# Patient Record
Sex: MALE | Race: WHITE | Employment: FULL TIME | URBAN - METROPOLITAN AREA
[De-identification: names, ages, dates, MRNs, and addresses within clinical notes are randomized per-mention and may not be internally consistent; named-entity substitution may affect disease eponyms.]

---

## 2019-02-06 ENCOUNTER — OFFICE VISIT (OUTPATIENT)
Dept: SURGERY | Age: 44
End: 2019-02-06
Payer: COMMERCIAL

## 2019-02-06 VITALS
WEIGHT: 264 LBS | SYSTOLIC BLOOD PRESSURE: 104 MMHG | HEIGHT: 67 IN | DIASTOLIC BLOOD PRESSURE: 80 MMHG | BODY MASS INDEX: 41.44 KG/M2

## 2019-02-06 DIAGNOSIS — K42.9 UMBILICAL HERNIA WITHOUT OBSTRUCTION AND WITHOUT GANGRENE: Primary | ICD-10-CM

## 2019-02-06 PROCEDURE — 99242 OFF/OP CONSLTJ NEW/EST SF 20: CPT | Performed by: SURGERY

## 2019-02-06 RX ORDER — ATORVASTATIN CALCIUM 40 MG/1
40 TABLET, FILM COATED ORAL
COMMUNITY
Start: 2018-10-12

## 2019-02-06 ASSESSMENT — ENCOUNTER SYMPTOMS
COUGH: 1
SINUS PRESSURE: 1
GASTROINTESTINAL NEGATIVE: 1
EYES NEGATIVE: 1
ALLERGIC/IMMUNOLOGIC NEGATIVE: 1

## 2019-03-26 ENCOUNTER — ANESTHESIA (OUTPATIENT)
Dept: OPERATING ROOM | Age: 44
End: 2019-03-26
Payer: COMMERCIAL

## 2019-03-26 ENCOUNTER — HOSPITAL ENCOUNTER (OUTPATIENT)
Age: 44
Setting detail: OUTPATIENT SURGERY
Discharge: HOME | End: 2019-03-26
Attending: SURGERY | Admitting: SURGERY
Payer: COMMERCIAL

## 2019-03-26 ENCOUNTER — ANESTHESIA EVENT (OUTPATIENT)
Dept: OPERATING ROOM | Age: 44
End: 2019-03-26
Payer: COMMERCIAL

## 2019-03-26 VITALS
DIASTOLIC BLOOD PRESSURE: 57 MMHG | WEIGHT: 268.9 LBS | TEMPERATURE: 97.6 F | HEART RATE: 60 BPM | HEIGHT: 67 IN | RESPIRATION RATE: 14 BRPM | OXYGEN SATURATION: 97 % | BODY MASS INDEX: 42.2 KG/M2 | SYSTOLIC BLOOD PRESSURE: 115 MMHG

## 2019-03-26 VITALS
SYSTOLIC BLOOD PRESSURE: 139 MMHG | RESPIRATION RATE: 23 BRPM | DIASTOLIC BLOOD PRESSURE: 87 MMHG | TEMPERATURE: 95.7 F | OXYGEN SATURATION: 95 %

## 2019-03-26 DIAGNOSIS — K42.9 UMBILICAL HERNIA WITHOUT OBSTRUCTION AND WITHOUT GANGRENE: Primary | ICD-10-CM

## 2019-03-26 PROCEDURE — 7100000001 HC PACU RECOVERY - ADDTL 15 MIN: Performed by: SURGERY

## 2019-03-26 PROCEDURE — 7100000000 HC PACU RECOVERY - FIRST 15 MIN: Performed by: SURGERY

## 2019-03-26 PROCEDURE — 3600000002 HC SURGERY LEVEL 2 BASE: Performed by: SURGERY

## 2019-03-26 PROCEDURE — 3700000001 HC ADD 15 MINUTES (ANESTHESIA): Performed by: SURGERY

## 2019-03-26 PROCEDURE — 3700000000 HC ANESTHESIA ATTENDED CARE: Performed by: SURGERY

## 2019-03-26 PROCEDURE — 6360000002 HC RX W HCPCS: Performed by: SURGERY

## 2019-03-26 PROCEDURE — 2580000003 HC RX 258: Performed by: SURGERY

## 2019-03-26 PROCEDURE — 2580000003 HC RX 258: Performed by: NURSE ANESTHETIST, CERTIFIED REGISTERED

## 2019-03-26 PROCEDURE — 49585 REPAIR UMBILICAL HERN,5+Y/O,REDUC: CPT | Performed by: SURGERY

## 2019-03-26 PROCEDURE — 2709999900 HC NON-CHARGEABLE SUPPLY: Performed by: SURGERY

## 2019-03-26 PROCEDURE — 2580000003 HC RX 258: Performed by: FAMILY MEDICINE

## 2019-03-26 PROCEDURE — 2500000003 HC RX 250 WO HCPCS: Performed by: SURGERY

## 2019-03-26 PROCEDURE — 7100000010 HC PHASE II RECOVERY - FIRST 15 MIN: Performed by: SURGERY

## 2019-03-26 PROCEDURE — 6360000002 HC RX W HCPCS: Performed by: NURSE ANESTHETIST, CERTIFIED REGISTERED

## 2019-03-26 PROCEDURE — 3600000012 HC SURGERY LEVEL 2 ADDTL 15MIN: Performed by: SURGERY

## 2019-03-26 PROCEDURE — 7100000011 HC PHASE II RECOVERY - ADDTL 15 MIN: Performed by: SURGERY

## 2019-03-26 PROCEDURE — 2500000003 HC RX 250 WO HCPCS: Performed by: NURSE ANESTHETIST, CERTIFIED REGISTERED

## 2019-03-26 RX ORDER — LABETALOL HYDROCHLORIDE 5 MG/ML
5 INJECTION, SOLUTION INTRAVENOUS EVERY 10 MIN PRN
Status: DISCONTINUED | OUTPATIENT
Start: 2019-03-26 | End: 2019-03-26 | Stop reason: HOSPADM

## 2019-03-26 RX ORDER — LIDOCAINE HYDROCHLORIDE 20 MG/ML
INJECTION, SOLUTION EPIDURAL; INFILTRATION; INTRACAUDAL; PERINEURAL PRN
Status: DISCONTINUED | OUTPATIENT
Start: 2019-03-26 | End: 2019-03-26 | Stop reason: SDUPTHER

## 2019-03-26 RX ORDER — ROCURONIUM BROMIDE 10 MG/ML
INJECTION, SOLUTION INTRAVENOUS PRN
Status: DISCONTINUED | OUTPATIENT
Start: 2019-03-26 | End: 2019-03-26 | Stop reason: SDUPTHER

## 2019-03-26 RX ORDER — SODIUM CHLORIDE 9 MG/ML
INJECTION, SOLUTION INTRAVENOUS CONTINUOUS
Status: DISCONTINUED | OUTPATIENT
Start: 2019-03-26 | End: 2019-03-26 | Stop reason: HOSPADM

## 2019-03-26 RX ORDER — OXYCODONE HYDROCHLORIDE 5 MG/1
5 TABLET ORAL PRN
Status: DISCONTINUED | OUTPATIENT
Start: 2019-03-26 | End: 2019-03-26 | Stop reason: HOSPADM

## 2019-03-26 RX ORDER — ONDANSETRON 2 MG/ML
INJECTION INTRAMUSCULAR; INTRAVENOUS PRN
Status: DISCONTINUED | OUTPATIENT
Start: 2019-03-26 | End: 2019-03-26 | Stop reason: SDUPTHER

## 2019-03-26 RX ORDER — SUCCINYLCHOLINE CHLORIDE 20 MG/ML
INJECTION INTRAMUSCULAR; INTRAVENOUS PRN
Status: DISCONTINUED | OUTPATIENT
Start: 2019-03-26 | End: 2019-03-26 | Stop reason: SDUPTHER

## 2019-03-26 RX ORDER — HYDROMORPHONE HCL 110MG/55ML
0.25 PATIENT CONTROLLED ANALGESIA SYRINGE INTRAVENOUS EVERY 5 MIN PRN
Status: DISCONTINUED | OUTPATIENT
Start: 2019-03-26 | End: 2019-03-26 | Stop reason: HOSPADM

## 2019-03-26 RX ORDER — KETOROLAC TROMETHAMINE 30 MG/ML
INJECTION, SOLUTION INTRAMUSCULAR; INTRAVENOUS PRN
Status: DISCONTINUED | OUTPATIENT
Start: 2019-03-26 | End: 2019-03-26 | Stop reason: SDUPTHER

## 2019-03-26 RX ORDER — FENTANYL CITRATE 50 UG/ML
50 INJECTION, SOLUTION INTRAMUSCULAR; INTRAVENOUS EVERY 5 MIN PRN
Status: DISCONTINUED | OUTPATIENT
Start: 2019-03-26 | End: 2019-03-26 | Stop reason: HOSPADM

## 2019-03-26 RX ORDER — HYDROMORPHONE HCL 110MG/55ML
0.5 PATIENT CONTROLLED ANALGESIA SYRINGE INTRAVENOUS EVERY 5 MIN PRN
Status: DISCONTINUED | OUTPATIENT
Start: 2019-03-26 | End: 2019-03-26 | Stop reason: HOSPADM

## 2019-03-26 RX ORDER — PROMETHAZINE HYDROCHLORIDE 25 MG/ML
6.25 INJECTION, SOLUTION INTRAMUSCULAR; INTRAVENOUS PRN
Status: DISCONTINUED | OUTPATIENT
Start: 2019-03-26 | End: 2019-03-26 | Stop reason: HOSPADM

## 2019-03-26 RX ORDER — GLYCOPYRROLATE 0.2 MG/ML
INJECTION INTRAMUSCULAR; INTRAVENOUS PRN
Status: DISCONTINUED | OUTPATIENT
Start: 2019-03-26 | End: 2019-03-26 | Stop reason: SDUPTHER

## 2019-03-26 RX ORDER — MEPERIDINE HYDROCHLORIDE 25 MG/ML
12.5 INJECTION INTRAMUSCULAR; INTRAVENOUS; SUBCUTANEOUS EVERY 5 MIN PRN
Status: DISCONTINUED | OUTPATIENT
Start: 2019-03-26 | End: 2019-03-26 | Stop reason: HOSPADM

## 2019-03-26 RX ORDER — HYDROCODONE BITARTRATE AND ACETAMINOPHEN 5; 325 MG/1; MG/1
1-2 TABLET ORAL EVERY 4 HOURS PRN
Qty: 15 TABLET | Refills: 0 | Status: SHIPPED | OUTPATIENT
Start: 2019-03-26 | End: 2019-03-29

## 2019-03-26 RX ORDER — DIPHENHYDRAMINE HYDROCHLORIDE 50 MG/ML
12.5 INJECTION INTRAMUSCULAR; INTRAVENOUS
Status: DISCONTINUED | OUTPATIENT
Start: 2019-03-26 | End: 2019-03-26 | Stop reason: HOSPADM

## 2019-03-26 RX ORDER — FENTANYL CITRATE 50 UG/ML
INJECTION, SOLUTION INTRAMUSCULAR; INTRAVENOUS PRN
Status: DISCONTINUED | OUTPATIENT
Start: 2019-03-26 | End: 2019-03-26 | Stop reason: SDUPTHER

## 2019-03-26 RX ORDER — NEOSTIGMINE METHYLSULFATE 5 MG/5 ML
SYRINGE (ML) INTRAVENOUS PRN
Status: DISCONTINUED | OUTPATIENT
Start: 2019-03-26 | End: 2019-03-26 | Stop reason: SDUPTHER

## 2019-03-26 RX ORDER — DEXAMETHASONE SODIUM PHOSPHATE 4 MG/ML
INJECTION, SOLUTION INTRA-ARTICULAR; INTRALESIONAL; INTRAMUSCULAR; INTRAVENOUS; SOFT TISSUE PRN
Status: DISCONTINUED | OUTPATIENT
Start: 2019-03-26 | End: 2019-03-26 | Stop reason: SDUPTHER

## 2019-03-26 RX ORDER — MAGNESIUM HYDROXIDE 1200 MG/15ML
LIQUID ORAL CONTINUOUS PRN
Status: COMPLETED | OUTPATIENT
Start: 2019-03-26 | End: 2019-03-26

## 2019-03-26 RX ORDER — OXYCODONE HYDROCHLORIDE 5 MG/1
10 TABLET ORAL PRN
Status: DISCONTINUED | OUTPATIENT
Start: 2019-03-26 | End: 2019-03-26 | Stop reason: HOSPADM

## 2019-03-26 RX ORDER — SODIUM CHLORIDE 0.9 % (FLUSH) 0.9 %
10 SYRINGE (ML) INJECTION EVERY 12 HOURS SCHEDULED
Status: DISCONTINUED | OUTPATIENT
Start: 2019-03-26 | End: 2019-03-26 | Stop reason: HOSPADM

## 2019-03-26 RX ORDER — MIDAZOLAM HYDROCHLORIDE 1 MG/ML
INJECTION INTRAMUSCULAR; INTRAVENOUS PRN
Status: DISCONTINUED | OUTPATIENT
Start: 2019-03-26 | End: 2019-03-26 | Stop reason: SDUPTHER

## 2019-03-26 RX ORDER — SODIUM CHLORIDE 9 MG/ML
INJECTION, SOLUTION INTRAVENOUS CONTINUOUS PRN
Status: DISCONTINUED | OUTPATIENT
Start: 2019-03-26 | End: 2019-03-26 | Stop reason: SDUPTHER

## 2019-03-26 RX ORDER — SODIUM CHLORIDE 0.9 % (FLUSH) 0.9 %
10 SYRINGE (ML) INJECTION PRN
Status: DISCONTINUED | OUTPATIENT
Start: 2019-03-26 | End: 2019-03-26 | Stop reason: HOSPADM

## 2019-03-26 RX ORDER — BUPIVACAINE HYDROCHLORIDE AND EPINEPHRINE 5; 5 MG/ML; UG/ML
INJECTION, SOLUTION EPIDURAL; INTRACAUDAL; PERINEURAL
Status: COMPLETED | OUTPATIENT
Start: 2019-03-26 | End: 2019-03-26

## 2019-03-26 RX ORDER — PROPOFOL 10 MG/ML
INJECTION, EMULSION INTRAVENOUS PRN
Status: DISCONTINUED | OUTPATIENT
Start: 2019-03-26 | End: 2019-03-26 | Stop reason: SDUPTHER

## 2019-03-26 RX ADMIN — CEFAZOLIN SODIUM 3 G: 10 INJECTION, POWDER, FOR SOLUTION INTRAVENOUS at 13:42

## 2019-03-26 RX ADMIN — SUCCINYLCHOLINE CHLORIDE 140 MG: 20 INJECTION, SOLUTION INTRAMUSCULAR; INTRAVENOUS at 13:51

## 2019-03-26 RX ADMIN — Medication 2 MG: at 14:22

## 2019-03-26 RX ADMIN — SODIUM CHLORIDE: 9 INJECTION, SOLUTION INTRAVENOUS at 11:28

## 2019-03-26 RX ADMIN — FENTANYL CITRATE 100 MCG: 50 INJECTION, SOLUTION INTRAMUSCULAR; INTRAVENOUS at 13:49

## 2019-03-26 RX ADMIN — PROPOFOL 200 MG: 10 INJECTION, EMULSION INTRAVENOUS at 13:50

## 2019-03-26 RX ADMIN — KETOROLAC TROMETHAMINE 30 MG: 30 INJECTION, SOLUTION INTRAMUSCULAR; INTRAVENOUS at 14:22

## 2019-03-26 RX ADMIN — MIDAZOLAM HYDROCHLORIDE 2 MG: 1 INJECTION, SOLUTION INTRAMUSCULAR; INTRAVENOUS at 13:45

## 2019-03-26 RX ADMIN — DEXAMETHASONE SODIUM PHOSPHATE 8 MG: 4 INJECTION, SOLUTION INTRAMUSCULAR; INTRAVENOUS at 13:55

## 2019-03-26 RX ADMIN — ROCURONIUM BROMIDE 20 MG: 10 INJECTION, SOLUTION INTRAVENOUS at 14:00

## 2019-03-26 RX ADMIN — ONDANSETRON 4 MG: 2 INJECTION INTRAMUSCULAR; INTRAVENOUS at 13:55

## 2019-03-26 RX ADMIN — GLYCOPYRROLATE 0.4 MG: 0.2 INJECTION, SOLUTION INTRAMUSCULAR; INTRAVENOUS at 14:22

## 2019-03-26 RX ADMIN — SODIUM CHLORIDE: 9 INJECTION, SOLUTION INTRAVENOUS at 13:20

## 2019-03-26 RX ADMIN — LIDOCAINE HYDROCHLORIDE 80 MG: 20 INJECTION, SOLUTION EPIDURAL; INFILTRATION; INTRACAUDAL; PERINEURAL at 13:50

## 2019-03-26 ASSESSMENT — PULMONARY FUNCTION TESTS
PIF_VALUE: 27
PIF_VALUE: 22
PIF_VALUE: 22
PIF_VALUE: 2
PIF_VALUE: 22
PIF_VALUE: 27
PIF_VALUE: 15
PIF_VALUE: 4
PIF_VALUE: 22
PIF_VALUE: 26
PIF_VALUE: 24
PIF_VALUE: 22
PIF_VALUE: 22
PIF_VALUE: 26
PIF_VALUE: 27
PIF_VALUE: 22
PIF_VALUE: 24
PIF_VALUE: 22
PIF_VALUE: 1
PIF_VALUE: 22
PIF_VALUE: 9
PIF_VALUE: 22
PIF_VALUE: 24
PIF_VALUE: 25
PIF_VALUE: 27
PIF_VALUE: 22
PIF_VALUE: 23
PIF_VALUE: 17
PIF_VALUE: 30
PIF_VALUE: 27
PIF_VALUE: 10
PIF_VALUE: 1
PIF_VALUE: 25
PIF_VALUE: 24
PIF_VALUE: 22
PIF_VALUE: 25
PIF_VALUE: 1
PIF_VALUE: 25

## 2019-03-26 ASSESSMENT — PAIN - FUNCTIONAL ASSESSMENT: PAIN_FUNCTIONAL_ASSESSMENT: 0-10

## 2019-04-12 ENCOUNTER — OFFICE VISIT (OUTPATIENT)
Dept: SURGERY | Age: 44
End: 2019-04-12

## 2019-04-12 VITALS — DIASTOLIC BLOOD PRESSURE: 80 MMHG | SYSTOLIC BLOOD PRESSURE: 110 MMHG

## 2019-04-12 DIAGNOSIS — K42.9 UMBILICAL HERNIA WITHOUT OBSTRUCTION AND WITHOUT GANGRENE: Primary | ICD-10-CM

## 2019-04-12 PROCEDURE — 99024 POSTOP FOLLOW-UP VISIT: CPT | Performed by: SURGERY

## 2020-01-27 ENCOUNTER — OFFICE VISIT (OUTPATIENT)
Dept: ORTHOPEDIC SURGERY | Age: 45
End: 2020-01-27
Payer: COMMERCIAL

## 2020-01-27 VITALS
DIASTOLIC BLOOD PRESSURE: 85 MMHG | WEIGHT: 265 LBS | HEIGHT: 67 IN | SYSTOLIC BLOOD PRESSURE: 129 MMHG | HEART RATE: 74 BPM | BODY MASS INDEX: 41.59 KG/M2

## 2020-01-27 PROCEDURE — 99203 OFFICE O/P NEW LOW 30 MIN: CPT | Performed by: ORTHOPAEDIC SURGERY

## 2020-01-27 PROCEDURE — 20610 DRAIN/INJ JOINT/BURSA W/O US: CPT | Performed by: ORTHOPAEDIC SURGERY

## 2020-01-27 RX ORDER — DICLOFENAC SODIUM 75 MG/1
75 TABLET, DELAYED RELEASE ORAL 2 TIMES DAILY
Qty: 60 TABLET | Refills: 2 | Status: SHIPPED | OUTPATIENT
Start: 2020-01-27 | End: 2020-03-02

## 2020-01-27 NOTE — PROGRESS NOTES
Is a new patient complaining of left shoulder pain. She has 2 never 3 weeks has had shoulder pain. Is can wake him up at night if he lays on his left side. He played sports growing up in is a  in [place] but he is not currently lifting. ROS: Pertinent items are noted in HPI.     No notes on file    Past Medical History:  No date: Bradycardia  No date: Colon polyp  No date: Diverticulitis  No date: Diverticulosis  No date: Hypercholesteremia  No date: IFG (impaired fasting glucose)     Past Surgical History:  No date: ADENOIDECTOMY      Comment:  UVULECTOMY  No date: COLONOSCOPY  No date: TONSILLECTOMY  0/59/9566: UMBILICAL HERNIA REPAIR; N/A      Comment:  OPEN [facility]'s family history indicates:  Problem: Heart Disease      Relation: Mother          Age of Onset: (Not Specified)  Problem: High Cholesterol      Relation: Mother          Age of Onset: (Not Specified)  Problem: Diabetes      Relation: Mother          Age of Onset: (Not Specified)  Problem: Heart Disease      Relation: Father          Age of Onset: (Not Specified)  Problem: High Blood Pressure      Relation: Father          Age of Onset: (Not Specified)  Problem: High Blood Pressure      Relation: Brother          Age of Onset: (Not Specified)      Social History    Socioeconomic History      Marital status:       Spouse name: None      Number of children: None      Years of education: None      Highest education level: None    Occupational History      None    Social Needs      Financial resource strain: None      Food insecurity:        Worry: None        Inability: None      Transportation needs:        Medical: None        Non-medical: None    Tobacco Use      Smoking status: Never Smoker      Smokeless tobacco: Current User        Types: Chew    Substance and Sexual Activity      Alcohol use: Yes        Comment: ONCE A YEAR      Drug use: None      Sexual activity: None    Lifestyle      Physical activity:        Days per week: None        Minutes per session: None      Stress: None    Relationships      Social connections:        Talks on phone: None        Gets together: None        Attends [employer] service: None        Active member of club or organization: None        Attends meetings of clubs or organizations: None        Relationship status: None      Intimate partner violence:        Fear of current or ex partner: None        Emotionally abused: None        Physically abused: None        Forced sexual activity: None    Other Topics      Concerns:        None    Social History Narrative      None      Current Outpatient Medications:  atorvastatin (LIPITOR) 40 MG tablet, Take 40 mg by mouth, Disp: , Rfl:     No current facility-administered medications for this visit. No Known Allergies    VITAL SIGNS:  /85   Pulse 74   Ht 5' 7\" (1.702 m)   Wt 265 lb (120.2 kg)   BMI 41.50 kg/m²   On examination he he can get 180 degrees of flexion but this hurts above 90 degrees. He can get about 170 degrees of abduction but this hurts above about 100 degrees. He is quite tender in [facility] joint is very positive crossarm test.  He has some anterior cuff tenderness and mild to moderately positive impingement signs. There is no supraspinatus or infraspinatus atrophy. At 90 degrees abduction he has 50 degrees internal rotation on the left versus 35 on the right he has about 90 degrees external rotation in both shoulders. He has negative belly press test bilaterally. He has obvious weakness of external rotation on the left. 3 views of the left shoulder obtained today. Shows well centered humeral head with no marginal osteophytes and no interosseous lesions. Exposure on the distal clavicle is difficult but it appears that there are some squaring of the end of the distal clavicle.     Impression left shoulder AC arthrosis and

## 2020-01-27 NOTE — PROGRESS NOTES
Depo medrol NDC [id number]  Lot# [id number]  Exp.03/2021  Shoulder,left    Ropivacaine    NCD#:  [id number]  LOT#:  [id number]  Exp Date:  05/2023  Shoulder, left

## 2020-02-04 ENCOUNTER — HOSPITAL ENCOUNTER (OUTPATIENT)
Dept: PHYSICAL THERAPY | Age: 45
Setting detail: THERAPIES SERIES
Discharge: HOME | End: 2020-02-04
Payer: COMMERCIAL

## 2020-02-04 PROCEDURE — 97161 PT EVAL LOW COMPLEX 20 MIN: CPT

## 2020-02-04 PROCEDURE — 97110 THERAPEUTIC EXERCISES: CPT

## 2020-02-04 NOTE — PLAN OF CARE
for the [employer], and notices that donning work belts, donning regular belts - are both painful activities. Additionally, sleeping on L arm, adjusting in bed, lifting and overhead movements induce pain. His goal for therapy is to restore full movement and strength in left shoulder. The patient reports that since injection, his pain has decreased considerably. He can still feel slight discomfort at end ranges, but this is improving/reducing. Relevant Medical History: NA  Functional Outcome: qDASH = 11% disability    Pain Scale: 1/10  Easing factors: rest, injection  Provocative factors: sleeping on L arm, reaching OH, lifting, reaching behind back    Type: []Constant   [x]Intermittent  []Radiating []Localized []other:     Numbness/Tingling: denies    Occupation/School:  for [employer] department    Living Status/Prior Level of Function: Prior to this injury / incident, pt was independent with ADLs and IADLs, and remains independent.     OBJECTIVE:   Hand dominance: right    Palpation: +1 TTP L greater tuberosity, LH biceps L, L coracoid process, L AC joint    Functional Mobility/Transfers: WFL    Posture: rounded shoulders, forward head    Bandages/Dressings/Incisions: NA    Dermatomes Normal Abnormal Comments   Top of head (C1) X     Posterior occipital region (C2) X     Side of neck (C3) X     Top of shoulder (C4) X     Lateral deltoid (C5) X     Tip of thumb (C6) X     Distal middle finger (C7) X     Distal fifth finger (C8) X     Medial forearm (T1) X        PROM AROM    L R L R   Shoulder Flexion  165  163 180   Shoulder Abduction  180  180 180   Shoulder External Rotation  90  85 90   Shoulder Internal Rotation  90  90 90   Elbow Flexion  WNL  WNL WNL   Elbow Extension  WNL  WNL WNL     Strength (0-5) Left Right    Shoulder Shrug (C4) 5 5   Shoulder Flex 4 5   Shoulder Abd (C5) 4 5   Shoulder ER 4 5   Shoulder IR 5 5   Biceps (C6) 5 5   Triceps (C7) 5 5   Lats []Environmental, home barriers              []profession/work barriers  []past PT/medical experience  [x]other: favorable response to previous injection  Justification: positive influence on potential     Falls Risk Assessment (30 days):   [x] Falls Risk assessed and no intervention required. [] Falls Risk assessed and Patient requires intervention due to being higher risk   TUG score (>12s at risk):     [] Falls education provided, including       ASSESSMENT: The patient is a 39-year old male who presents with signs and symptoms consistent with L shoulder tendonitis secondary to impingement. The patient will benefit from skilled PT services to address limited shoulder ROM, shoulder and scapular strength, biomechanics, posture, as well as functional mobility during ADLs/iADLs. The patient's favorable response to cortisone injection should facilitate results at this time.     Functional Impairments   []Noted spinal or UE joint hypomobility   []Noted spinal or UE joint hypermobility   []Decreased UE functional ROM   [x]Decreased UE functional strength   []Abnormal reflexes/sensation/myotomal/dermatomal deficits   [x]Decreased RC/scapular/core strength and neuromuscular control   []other:      Functional Activity Limitations (from functional questionnaire and intake)   []Reduced ability to tolerate prolonged functional positions   [x]Reduced ability or difficulty with changes of positions or transfers between positions   []Reduced ability to maintain good posture and demonstrate good body mechanics with sitting, bending, and lifting   [] Reduced ability or tolerance with driving and/or computer work   [x]Reduced ability to sleep   [x]Reduced ability to perform lifting, reaching, carrying tasks   [x]Reduced ability to tolerate impact through UE   [x]Reduced ability to reach behind back   []Reduced ability to  or hold objects   []Reduced ability to throw or toss an object   []other:    Participation Restrictions   [x]Reduced participation in self care activities   []Reduced participation in home management activities   [x]Reduced participation in work activities   [x]Reduced participation in social activities. [x]Reduced participation in sport/recreation activities. Classification:   []Signs/symptoms consistent with post-surgical status including decreased ROM, strength and function.   []Signs/symptoms consistent with joint sprain/strain   [x]Signs/symptoms consistent with shoulder impingement and acquired tedinopathy   []Signs/symptoms consistent with shoulder/elbow/wrist tendinopathy   []Signs/symptoms consistent with Rotator cuff tear   []Signs/symptoms consistent with labral tear   []Signs/symptoms consistent with postural dysfunction    []Signs/symptoms consistent with Glenohumeral IR Deficit - <45 degrees   []Signs/symptoms consistent with facet dysfunction of cervical/thoracic spine    []Signs/symptoms consistent with pathology which may benefit from Dry needling     []other:     Prognosis/Rehab Potential:      [x]Excellent   []Good    []Fair   []Poor    Tolerance of evaluation/treatment:    [x]Excellent   []Good    []Fair   []Poor    Physical Therapy Evaluation Complexity Justification  [x] A history of present problem with:  [] no personal factors and/or comorbidities that impact the plan of care;  [x]1-2 personal factors and/or comorbidities that impact the plan of care  []3 personal factors and/or comorbidities that impact the plan of care  [x] An examination of body systems using standardized tests and measures addressing any of the following: body structures and functions (impairments), activity limitations, and/or participation restrictions;:  [] a total of 1-2 or more elements   [] a total of 3 or more elements   [x] a total of 4 or more elements   [x] A clinical presentation with:  [x] stable and/or uncomplicated characteristics   [] evolving clinical presentation with changing less for the UEFI or Quick DASH to assist with reaching prior level of function. [] Progressing: [] Met: [] Not Met: [] Adjusted  2. Patient will demonstrate increased AROM to >170deg AROM flex, 180deg ABD, 90deg ER/IR to allow for proper joint functioning as indicated by patients Functional Deficits. [] Progressing: [] Met: [] Not Met: [] Adjusted  3. Patient will demonstrate an increase in Strength to 5/5 L RTC, MT/LT/rhomboid strength to allow for proper functional mobility as indicated by patients Functional Deficits. [] Progressing: [] Met: [] Not Met: [] Adjusted  4. Patient will return to functional activities including donning belts for outfits and work uniforms without increased symptoms or restriction.    [] Progressing: [] Met: [] Not Met: [] Adjusted    Electronically signed by:  Taran Thomas, PT, DPT

## 2020-02-04 NOTE — FLOWSHEET NOTE
goals & options. - The following exercises were added to the patient's home exercise program. (prone rows, prone ext, scap squeezes, wall slides flex/scap, S/L ER) Additionally, the patient was educated on appropriate frequency, intensity and duration. A handout was provided  - All patient questions were answered     Therapeutic Exercise and NMR EXR  [x] (55759) Provided verbal/tactile cueing for activities related to strengthening, flexibility, endurance, ROM for improvements in  [] LE / Lumbar: LE, proximal hip, and core control with self care, mobility, lifting, ambulation. [x] UE / Cervical: cervical, postural, scapular, scapulothoracic and UE control with self care, reaching, carrying, lifting, house/yardwork, driving, computer work.  [] (33160) Provided verbal/tactile cueing for activities related to improving balance, coordination, kinesthetic sense, posture, motor skill, proprioception to assist with   [] LE / lumbar: LE, proximal hip, and core control in self care, mobility, lifting, ambulation and eccentric single leg control. [] UE / cervical: cervical, scapular, scapulothoracic and UE control with self care, reaching, carrying, lifting, house/yardwork, driving, computer work.      NMR and Therapeutic Activities:    [] (68500 or 30166) Provided verbal/tactile cueing for activities related to improving balance, coordination, kinesthetic sense, posture, motor skill, proprioception and motor activation to allow for proper function of   [] LE: / Lumbar core, proximal hip and LE with self care and ADLs  [] UE / Cervical: cervical, postural, scapular, scapulothoracic and UE control with self care, carrying, lifting, driving, computer work.   [] (35382) Gait Re-education- Provided training and instruction to the patient for proper LE, core and proximal hip recruitment and positioning and eccentric body weight control with ambulation re-education including up and down stairs     Home Exercise Program:    [x] [] Ionto  [] NMR (50977) x      [] Vaso  [] Manual (84267) x      [] Ultrasound  [] TA x       [] Mech Traction (91245)  [] Gait Training x     [] ES (un) (69517):   [] Aquatic therapy x   [] Other:   [] Group:     GOALS:   Patient stated goal: to restore full movement and strength in shoulder. []? Progressing: []? Met: []? Not Met: []? Adjusted     Therapist goals for Patient:   Short Term Goals: To be achieved in: 2 weeks  1. Independent in HEP and progression per patient tolerance, in order to prevent re-injury. []? Progressing: []? Met: []? Not Met: []? Adjusted  2. Patient will have a decrease in pain to 0/10 facilitate improvement in movement, function, and ADLs as indicated by Functional Deficits. []? Progressing: []? Met: []? Not Met: []? Adjusted     Long Term Goals: To be achieved in: 4 weeks  1. Disability index score of <10% or less for the UEFI or Quick DASH to assist with reaching prior level of function. []? Progressing: []? Met: []? Not Met: []? Adjusted  2. Patient will demonstrate increased AROM to >170deg AROM flex, 180deg ABD, 90deg ER/IR to allow for proper joint functioning as indicated by patients Functional Deficits. []? Progressing: []? Met: []? Not Met: []? Adjusted  3. Patient will demonstrate an increase in Strength to 5/5 L RTC, MT/LT/rhomboid strength to allow for proper functional mobility as indicated by patients Functional Deficits. []? Progressing: []? Met: []? Not Met: []? Adjusted  4. Patient will return to functional activities including donning belts for outfits and work uniforms without increased symptoms or restriction. []? Progressing: []? Met: []? Not Met: []? Adjusted    Overall Progression Towards Functional goals/ Treatment Progress Update:  [] Patient is progressing as expected towards functional goals listed. [] Progression is slowed due to complexities/Impairments listed. [] Progression has been slowed due to co-morbidities.   [x] Plan just implemented, too soon to assess goals progression <30days   [] Goals require adjustment due to lack of progress  [] Patient is not progressing as expected and requires additional follow up with physician  [] Other:     Persisting Functional Limitations/Impairments:  []Sleeping []Sitting               []Standing []Transfers        []Walking []Kneeling               []Stairs []Squatting / bending   [x]ADLs [x]Reaching  [x]Lifting  [x]Housework  []Driving [x]Job related tasks  []Sports/Recreation []Other:      ASSESSMENT:  SEE EVAL  Treatment/Activity Tolerance:  [x] Patient tolerated treatment well [] Patient limited by fatigue  [] Patient limited by pain  [] Patient limited by other medical complications  [] Other:     Prognosis: [x] Good [] Fair  [] Poor    Patient Requires Follow-up: [x] Yes  [] No        PLAN: POC: PT 1-2x / week for 3-4 weeks (6v planned as of evaluation). Assess response to HEP. Progress end-range flexion/scaption, progress strength of RTC & scapulas w/o pain  [] Continue per plan of care [] Alter current plan (see comments)  [x] Plan of care initiated [] Hold pending MD visit [] Discharge    Electronically signed by: Lucie Wood PT, DPT    Note: If patient does not return for scheduled/ recommended follow up visits, this note will serve as a discharge from care along with most recent update on progress.

## 2020-02-15 ENCOUNTER — HOSPITAL ENCOUNTER (OUTPATIENT)
Dept: PHYSICAL THERAPY | Age: 45
Setting detail: THERAPIES SERIES
Discharge: HOME | End: 2020-02-15
Payer: COMMERCIAL

## 2020-02-15 PROCEDURE — 97112 NEUROMUSCULAR REEDUCATION: CPT

## 2020-02-15 PROCEDURE — 97110 THERAPEUTIC EXERCISES: CPT

## 2020-02-15 NOTE — FLOWSHEET NOTE
[facility] - Outpatient Physical Therapy    Physical Therapy Daily Treatment Note  Date:  2/15/2020    Patient Name:  Dasia Robles    :  1975  MRN: [id number]  Medical/Treatment Diagnosis Information:  · Diagnosis: M75.82- Rotator cuff tendinitis, left, M19.012 - Arthritis of left acromioclavicular joint  · Treatment Diagnosis: decreased shoulder flexion/abduction ROM, decreased shoulder strength, shoulder pain  Insurance/Certification information:  PT Insurance Information: 4 [facility] after [facility] Diadema   Physician Information:  Referring Practitioner: Ellen Dalal MD  Plan of care signed (Y/N): [x]  Yes []  No      Progress Report: []  Yes  [x]  No     Date Range for reporting period:  Beginning 2020  Ending TBD     Progress report due (10 Rx/or 30 days whichever is less): 6th visit    Recertification due (POC duration/ or 90 days whichever is less): 6th visit    Visit # Insurance Allowable Date Range (if applicable)    45 pcy (needs pre-auth) TBD     Latex Allergy:  [x]NO      []YES  Preferred Language for Healthcare:   [x]English       []other:    Functional Scale: qDASH: 11% dis  Date assessed: 2020    Pain level:  1/10     SUBJECTIVE:  The patient reports exercises do not cause pain at this time. Reaching behind back to remove jacket, tucking in shirts, reaching for items on police belt are all still mildly irritating. When he removes jacket behind back and pulls arm away (out of sleeve) he notices increased pain.     OBJECTIVE: 2/15 - scap setting abolishes impingement with several exercises/shoulder movements    RESTRICTIONS/PRECAUTIONS: NA    Exercises/Interventions:     Therapeutic Exercises (24120) - 25'  Resistance Sets / Reps Notes   Prone rows Prone ext S/L ER 2# 2x fatigue 2/15 - progressed weight / reps   scap squeezes Wall slides (flexion/scaption) IR Strap Stretch  5 x 10'' 2/15 - added   TB ER orange 2 x fatigue 2/15 - added   TB IR blue 3 x 15 2/15 - added; emphasis on scap retraction   TB Low T's blue 3 x 10 2/15 - added               Neuromuscular Re-ed (72993)  - 10' Therapeutic Activities (41138)      S/L Abd w/ scap retraction 3# 3 x 10 2/15 - added; scap set abolishes impingment; unchecked scapula allows pain   Serratus Punch Plus 4# 3 x 15 2/15 - added                           Manual Intervention (01801) - 2'      Seated IR MWM - L  2 x 5 2/15 - added                                   Patient Education & HEP:  - Patient educated on the focus of skilled physical therapy services and plan of care, including: diagnosis, prognosis, treatment goals & options. 2/4 - The following exercises were added to the patient's home exercise program. (prone rows, prone ext, scap squeezes, wall slides flex/scap, S/L ER) Additionally, the patient was educated on appropriate frequency, intensity and duration. A handout was provided  - All patient questions were answered  2/15 - The following exercises were performed and added to the patient's home exercise program. (serratus punches, S/L abduction, TB IR, TB ER, TB Low T's) Additionally, the patient was educated on appropriate frequency, intensity and duration. Handout provided. Orange &  blue band provided. Therapeutic Exercise and NMR EXR  [x] (22405) Provided verbal/tactile cueing for activities related to strengthening, flexibility, endurance, ROM for improvements in  [] LE / Lumbar: LE, proximal hip, and core control with self care, mobility, lifting, ambulation. [x] UE / Cervical: cervical, postural, scapular, scapulothoracic and UE control with self care, reaching, carrying, lifting, house/yardwork, driving, computer work.   [x] (32144) Provided verbal/tactile cueing for activities related to improving balance, coordination, kinesthetic sense, posture, motor skill, proprioception to assist with   [] LE / lumbar: LE, proximal hip, and core control in self care, mobility, for the purpose of modulating pain, promoting relaxation,  increasing ROM, reducing/eliminating soft tissue swelling/inflammation/restriction, improving soft tissue extensibility and allowing for proper ROM for normal function with   [] LE / lumbar: self care, mobility, lifting and ambulation. [x] UE / Cervical: self care, reaching, carrying, lifting, house/yardwork, driving, computer work. Modalities:  [] (00586) Vasopneumatic compression: Utilized vasopneumatic compression to decrease edema / swelling for the purpose of improving mobility and quad tone / recruitment which will allow for increased overall function including but not limited to self-care, transfers, ambulation, and ascending / descending stairs. Modalities: [place] [facility] [place]     Charges:  Timed Code Treatment Minutes: 37   Total Treatment Minutes: 37     [] EVAL - LOW (49742)   [] EVAL - MOD (51371)  [] EVAL - HIGH (04698)  [] RE-EVAL (34257)  [x] TE (21002) x 1      [] Ionto  [x] NMR (13466) x 1      [] Vaso  [] Manual (54239) x      [] Ultrasound  [] TA x       [] Mech Traction (34600)  [] Gait Training x     [] ES (un) (02105):   [] Aquatic therapy x   [] Other:   [] Group:     GOALS:   Patient stated goal: to restore full movement and strength in shoulder. [x]? Progressing: []? Met: []? Not Met: []? Adjusted     Therapist goals for Patient:   Short Term Goals: To be achieved in: 2 weeks  1. Independent in HEP and progression per patient tolerance, in order to prevent re-injury. [x]? Progressing: []? Met: []? Not Met: []? Adjusted  2. Patient will have a decrease in pain to 0/10 facilitate improvement in movement, function, and ADLs as indicated by Functional Deficits. [x]? Progressing: []? Met: []? Not Met: []? Adjusted     Long Term Goals: To be achieved in: 4 weeks  1. Disability index score of <10% or less for the UEFI or Quick DASH to assist with reaching prior level of function. []? Progressing: []? Met: []?  Not Met: []? limited by pain  [] Patient limited by other medical complications  [] Other:     Prognosis: [x] Good [] Fair  [] Poor    Patient Requires Follow-up: [x] Yes  [] No        PLAN: POC: PT 1-2x / week for 3-4 weeks (6v planned as of evaluation). Assess response to HEP. Progress end-range flexion/scaption, progress scapular strength (T's, Y's), RTC strengthening moving away from body, behind back/jacket removal activity simulation  [x] Continue per plan of care [] Alter current plan (see comments)  [] Plan of care initiated [] Hold pending MD visit [] Discharge    Electronically signed by: Neeru Emery PT, DPT    Note: If patient does not return for scheduled/ recommended follow up visits, this note will serve as a discharge from care along with most recent update on progress.

## 2020-02-17 ENCOUNTER — HOSPITAL ENCOUNTER (OUTPATIENT)
Dept: PHYSICAL THERAPY | Age: 45
Setting detail: THERAPIES SERIES
Discharge: HOME | End: 2020-02-17
Payer: COMMERCIAL

## 2020-02-17 PROCEDURE — 97110 THERAPEUTIC EXERCISES: CPT

## 2020-02-17 PROCEDURE — 97112 NEUROMUSCULAR REEDUCATION: CPT

## 2020-02-17 NOTE — FLOWSHEET NOTE
CC:  Bicep Curls  Triceps Curls  Single Arm High Row   35#  35#  25#   2 x 10  2 x 10  2 x 10 L   2/17 - added  2/17 - added  2/17 - added         Neuromuscular Re-ed (34263)  - 8' Therapeutic Activities (75432)      S/L Abd w/ scap retraction Serratus Punch Plus 5# 3 x 10   2/17 - modified   Bodyblade ER/IR  Bodyblade Flexion  3 x 15'' L  3 x 15'' L 2/17 2/17                      Manual Intervention (15056) - 2'      Seated IR MWM - L  ROM assessment  2'                              Patient Education & HEP:  - Patient educated on the focus of skilled physical therapy services and plan of care, including: diagnosis, prognosis, treatment goals & options. 2/4 - The following exercises were added to the patient's home exercise program. (prone rows, prone ext, scap squeezes, wall slides flex/scap, S/L ER) Additionally, the patient was educated on appropriate frequency, intensity and duration. A handout was provided  - All patient questions were answered  2/15 - The following exercises were performed and added to the patient's home exercise program. (serratus punches, S/L abduction, TB IR, TB ER, TB Low T's) Additionally, the patient was educated on appropriate frequency, intensity and duration. Handout provided. Orange &  blue band provided. Therapeutic Exercise and NMR EXR  [x] (42960) Provided verbal/tactile cueing for activities related to strengthening, flexibility, endurance, ROM for improvements in  [] LE / Lumbar: LE, proximal hip, and core control with self care, mobility, lifting, ambulation. [x] UE / Cervical: cervical, postural, scapular, scapulothoracic and UE control with self care, reaching, carrying, lifting, house/yardwork, driving, computer work.   [x] (83911) Provided verbal/tactile cueing for activities related to improving balance, coordination, kinesthetic sense, posture, motor skill, proprioception to assist with   [] LE / lumbar: LE, proximal hip, and core control in self care, mobility, lifting, ambulation and eccentric single leg control. [x] UE / cervical: cervical, scapular, scapulothoracic and UE control with self care, reaching, carrying, lifting, house/yardwork, driving, computer work.      NMR and Therapeutic Activities:    [x] (63238 or 99484) Provided verbal/tactile cueing for activities related to improving balance, coordination, kinesthetic sense, posture, motor skill, proprioception and motor activation to allow for proper function of   [] LE: / Lumbar core, proximal hip and LE with self care and ADLs  [x] UE / Cervical: cervical, postural, scapular, scapulothoracic and UE control with self care, carrying, lifting, driving, computer work.   [] (96443) Gait Re-education- Provided training and instruction to the patient for proper LE, core and proximal hip recruitment and positioning and eccentric body weight control with ambulation re-education including up and down stairs     Home Exercise Program:    [] (76726) Reviewed/Progressed HEP activities related to strengthening, flexibility, endurance, ROM of   [] LE / Lumbar: core, proximal hip and LE for functional self-care, mobility, lifting and ambulation/stair navigation   [] UE / Cervical: cervical, postural, scapular, scapulothoracic and UE control with self care, reaching, carrying, lifting, house/yardwork, driving, computer work  [] (26888)Reviewed/Progressed HEP activities related to improving balance, coordination, kinesthetic sense, posture, motor skill, proprioception of   [] LE: core, proximal hip and LE for self care, mobility, lifting, and ambulation/stair navigation    [] UE / Cervical: cervical, postural,  scapular, scapulothoracic and UE control with self care, reaching, carrying, lifting, house/yardwork, driving, computer work    Manual Treatments:  PROM / STM / Oscillations-Mobs:  G-I, II, III, IV (PA's, Inf., Post.)  [x] (93431) Provided manual therapy to mobilize LE, proximal hip and/or LS spine soft tissue/joints for pain  [] Patient limited by other medical complications  [] Other:     Prognosis: [x] Good [] Fair  [] Poor    Patient Requires Follow-up: [x] Yes  [] No        PLAN: POC: PT 1-2x / week for 3-4 weeks (6v planned as of evaluation). Progress strength of flexion/scaption (0-90) & (90-OH), progress scapular strength (prone row, ext, T's, Y's), RTC strengthening moving away from body (0 towards 90), behind back/jacket removal activity simulation (IR strap); for the following week (2/25 - w/ ESTEFANIA) OH activity simulation  [x] Continue per plan of care [] Alter current plan (see comments)  [] Plan of care initiated [] Hold pending MD visit [] Discharge    Electronically signed by: Wade Dsouza PT, DPT    Note: If patient does not return for scheduled/ recommended follow up visits, this note will serve as a discharge from care along with most recent update on progress.

## 2020-02-22 ENCOUNTER — HOSPITAL ENCOUNTER (OUTPATIENT)
Dept: PHYSICAL THERAPY | Age: 45
Setting detail: THERAPIES SERIES
Discharge: HOME | End: 2020-02-22
Payer: COMMERCIAL

## 2020-02-22 PROCEDURE — 97110 THERAPEUTIC EXERCISES: CPT

## 2020-02-22 PROCEDURE — 97112 NEUROMUSCULAR REEDUCATION: CPT

## 2020-02-22 PROCEDURE — 97140 MANUAL THERAPY 1/> REGIONS: CPT

## 2020-02-22 NOTE — FLOWSHEET NOTE
Patient identifiers for Neal Vázquez [facility] - Outpatient Physical Therapy    Physical Therapy Daily Treatment Note  Date:  2020    Patient Name:  Heath Chirinos    :  1975  MRN: [id number]  Medical/Treatment Diagnosis Information:  · Diagnosis: M75.82- Rotator cuff tendinitis, left, M19.012 - Arthritis of left acromioclavicular joint  · Treatment Diagnosis: decreased shoulder flexion/abduction ROM, decreased shoulder strength, shoulder pain  Insurance/Certification information:  PT Insurance Information: [address] [place] [address] after Jackson Diadema 1904  Physician Information:  Referring Practitioner: Idalia Carreon MD  Plan of care signed (Y/N): [x]  Yes []  No      Progress Report: []  Yes  [x]  No     Date Range for reporting period:  Beginning 2020  Ending TBD     Progress report due (10 Rx/or 30 days whichever is less): 6th visit    Recertification due (POC duration/ or 90 days whichever is less): 6th visit    Visit # Insurance Allowable Date Range (if applicable)    45 pcy (needs pre-auth) TBD     Latex Allergy:  [x]NO      []YES  Preferred Language for Healthcare:   [x]English       []other:    Functional Scale: qDASH: 11% dis  Date assessed: 2020    Pain level:  0/10, 1/10 soreness      SUBJECTIVE:  The patient reports shoulder is progressively getting better. Main residual problem is reaching behind his body or leaning/laying on left shoulder. Pain tends to be near bicipital groove.     OBJECTIVE: 2/15 - scap setting abolishes impingement with several exercises/shoulder movements    RESTRICTIONS/PRECAUTIONS: NA    Exercises/Interventions:     Therapeutic Exercises (06865) - '  Resistance Sets / Reps Notes   UBE L1 2 min fwd    1 min bwd    Prone rows 5# 3 x 10  - progressed weight/reps   Prone ext 4# 3 x 10  - progressed weight/reps   Prone Y's 1# 3 x 10  - added;  last set w/ thumb up; cues for scap set   Prone T's 2# 3 x 10   - added;  last set w/ thumb up; cues for scap set   S/L ER 2# 2x  to fatigue  (22, 13)    scap squeezes Wall slides (flexion/scaption) IR Strap Stretch  4x20\"TB ER TB IR TB Low T's Biodex CC:  Bicep Curls  Triceps Curls  Single Arm High Row       Neuromuscular Re-ed (05979)  - 8' Therapeutic Activities (93952)      S/L Abd w/ scap retraction Serratus Punch Plus 5# 3 x 10   2/17 - modified   Bodyblade ER/IR  Bodyblade Flexion  3 x 20'' L  3 x 20'' L 2/22-advanced to doing each set in ~20 deg more motion                       Manual Intervention (82898) - 8'      Seated IR MWM - L           sidelying x-friction massage to long head bicep  3'    S/l post glides/scap ret mobs and MWM for IR behind back  5'            Patient Education & HEP:  - Patient educated on the focus of skilled physical therapy services and plan of care, including: diagnosis, prognosis, treatment goals & options. 2/4 - The following exercises were added to the patient's home exercise program. (prone rows, prone ext, scap squeezes, wall slides flex/scap, S/L ER) Additionally, the patient was educated on appropriate frequency, intensity and duration. A handout was provided  - All patient questions were answered  2/15 - The following exercises were performed and added to the patient's home exercise program. (serratus punches, S/L abduction, TB IR, TB ER, TB Low T's) Additionally, the patient was educated on appropriate frequency, intensity and duration. Handout provided. Orange &  blue band provided. 2/22: instructed in and performed standing bicep stretch in [facility]     Therapeutic Exercise and NMR EXR  [x] (84328) Provided verbal/tactile cueing for activities related to strengthening, flexibility, endurance, ROM for improvements in  [] LE / Lumbar: LE, proximal hip, and core control with self care, mobility, lifting, ambulation.   [x] UE / Cervical: cervical, postural, scapular, scapulothoracic and UE control with self care, reaching, carrying, lifting, tightness and tenderness over long head of bicep. Addressed with STM and stretches today. Progressing well overall. Continue PT    Treatment/Activity Tolerance:   [x] Patient tolerated treatment well [] Patient limited by fatigue  [] Patient limited by pain  [] Patient limited by other medical complications  [] Other:     Prognosis: [x] Good [] Fair  [] Poor    Patient Requires Follow-up: [x] Yes  [] No        PLAN: POC: PT 1-2x / week for 3-4 weeks (6v planned as of evaluation). Progress strength of flexion/scaption (0-90) & (90-OH), progress scapular strength (prone row, ext, T's, Y's), RTC strengthening moving away from body (0 towards 90), behind back/jacket removal activity simulation (IR strap); for the following week (2/25 - w/ ESTEFANIA) OH activity simulation  [x] Continue per plan of care [] Alter current plan (see comments)  [] Plan of care initiated [] Hold pending MD visit [] Discharge    Electronically signed by: Sis Kate PT, DPT    Note: If patient does not return for scheduled/ recommended follow up visits, this note will serve as a discharge from care along with most recent update on progress.

## 2020-02-25 ENCOUNTER — HOSPITAL ENCOUNTER (OUTPATIENT)
Dept: PHYSICAL THERAPY | Age: 45
Setting detail: THERAPIES SERIES
Discharge: HOME | End: 2020-02-25
Payer: COMMERCIAL

## 2020-02-25 PROCEDURE — 97110 THERAPEUTIC EXERCISES: CPT

## 2020-02-25 NOTE — FLOWSHEET NOTE
45#2 x 152 x 152 x 15, B  1 x 15, ea, B2/25 - added reps2/25 - added reps2/25 - added  2/25 - addedStanding Alt 10's:  Flex (0-90)  Scaption (0-90)  0 - OH (flex/scap)   2#  2#  2#   1 x 10 ea  1 x 10 ea  1 x 10 ea   2/25 - added  2/25 - added  2/25 - added   Pushups on incline + serratus plus  2 x 10 2/25 - added         Neuromuscular Re-ed (37906)   Therapeutic Activities (98790)      S/L Abd w/ scap retraction Serratus Punch Plus Bodyblade ER/IR  Bodyblade Flexion                    Manual Intervention (01921)       Seated IR MWM - L           sidelying x-friction massage to long head bicep     S/l post glides/scap ret mobs and MWM for IR behind back             Patient Education & HEP:  - Patient educated on the focus of skilled physical therapy services and plan of care, including: diagnosis, prognosis, treatment goals & options. 2/4 - The following exercises were added to the patient's home exercise program. (prone rows, prone ext, scap squeezes, wall slides flex/scap, S/L ER) Additionally, the patient was educated on appropriate frequency, intensity and duration. A handout was provided  - All patient questions were answered  2/15 - The following exercises were performed and added to the patient's home exercise program. (serratus punches, S/L abduction, TB IR, TB ER, TB Low T's) Additionally, the patient was educated on appropriate frequency, intensity and duration. Handout provided. Orange &  blue band provided. 2/22: instructed in and performed standing bicep stretch in doorway     Therapeutic Exercise and NMR EXR  [x] (65306) Provided verbal/tactile cueing for activities related to strengthening, flexibility, endurance, ROM for improvements in  [] LE / Lumbar: LE, proximal hip, and core control with self care, mobility, lifting, ambulation.   [x] UE / Cervical: cervical, postural, scapular, scapulothoracic and UE control with self care, reaching, carrying, lifting, house/yardwork, driving, computer Met: []? Not Met: []? Adjusted     Long Term Goals: To be achieved in: 4 weeks  1. Disability index score of <10% or less for the UEFI or Quick DASH to assist with reaching prior level of function. [x]? Progressing: []? Met: []? Not Met: []? Adjusted  2. Patient will demonstrate increased AROM to >170deg AROM flex, 180deg ABD, 90deg ER/IR to allow for proper joint functioning as indicated by patients Functional Deficits. [x]? Progressing: []? Met: []? Not Met: []? Adjusted  3. Patient will demonstrate an increase in Strength to 5/5 L RTC, MT/LT/rhomboid strength to allow for proper functional mobility as indicated by patients Functional Deficits. [x]? Progressing: []? Met: []? Not Met: []? Adjusted  4. Patient will return to functional activities including donning belts for outfits and work uniforms without increased symptoms or restriction. [x]? Progressing: []? Met: []? Not Met: []? Adjusted    Overall Progression Towards Functional goals/ Treatment Progress Update:  [x] Patient is progressing as expected towards functional goals listed. [] Progression is slowed due to complexities/Impairments listed. [] Progression has been slowed due to co-morbidities. [] Plan just implemented, too soon to assess goals progression <30days   [] Goals require adjustment due to lack of progress  [] Patient is not progressing as expected and requires additional follow up with physician  [] Other:     Persisting Functional Limitations/Impairments:  []Sleeping []Sitting               []Standing []Transfers        []Walking []Kneeling               []Stairs []Squatting / bending   [x]ADLs [x]Reaching  [x]Lifting  [x]Housework  []Driving [x]Job related tasks  []Sports/Recreation []Other:      ASSESSMENT:  The patient is tolerating progression of exercises well. He still needs occasional tc's for activation/timing of scap retraction prior to moving LUE. The patient is on track to DC next visit.     Treatment/Activity Tolerance:   [x] Patient tolerated treatment well [] Patient limited by fatigue  [] Patient limited by pain  [] Patient limited by other medical complications  [] Other:     Prognosis: [x] Good [] Fair  [] Poor    Patient Requires Follow-up: [x] Yes  [] No        PLAN: POC: PT 1-2x / week for 3-4 weeks (6v planned as of evaluation). Progress strength of flexion/scaption (0-90) & (90-OH), progress scapular strength (prone row, ext, T's, Y's), RTC strengthening moving away from body (0 towards 90), behind back/jacket removal activity simulation (IR strap); for the following week (2/25 - w/ JB) OH activity simulation; consider DC  [x] Continue per plan of care [] Alter current plan (see comments)  [] Plan of care initiated [] Hold pending MD visit [] Discharge    Electronically signed by: Liza Lucero PT, DPT    Note: If patient does not return for scheduled/ recommended follow up visits, this note will serve as a discharge from care along with most recent update on progress.

## 2020-02-28 ENCOUNTER — HOSPITAL ENCOUNTER (OUTPATIENT)
Dept: PHYSICAL THERAPY | Age: 45
Setting detail: THERAPIES SERIES
Discharge: HOME | End: 2020-02-28
Payer: COMMERCIAL

## 2020-02-28 PROCEDURE — 97530 THERAPEUTIC ACTIVITIES: CPT

## 2020-02-28 NOTE — DISCHARGE SUMMARY
Row  Lat Pull Down Standing Alt 10's:  Flex (0-90)  Scaption (0-90)  0 - OH (flex/scap) Pushups on incline + serratus plus       Neuromuscular Re-ed (86527)   Therapeutic Activities (07749) - 20'      S/L Abd w/ scap retraction Serratus Punch Plus Bodyblade ER/IR  Bodyblade Flexion  2/28 - SEE DC  20'                Manual Intervention ([phone])       Seated IR MWM - L           sidelying x-friction massage to long head bicep     S/l post glides/scap ret mobs and MWM for IR behind back             2/28 - The patient was provided a final assessment including evaluative tests and measures, as well as discharge documentation to track progress. - 14'    Patient Education & HEP:  - Patient educated on the focus of skilled physical therapy services and plan of care, including: diagnosis, prognosis, treatment goals & options. 2/4 - The following exercises were added to the patient's home exercise program. (prone rows, prone ext, scap squeezes, wall slides flex/scap, S/L ER) Additionally, the patient was educated on appropriate frequency, intensity and duration. A handout was provided  - All patient questions were answered  2/15 - The following exercises were performed and added to the patient's home exercise program. (serratus punches, S/L abduction, TB IR, TB ER, TB Low T's) Additionally, the patient was educated on appropriate frequency, intensity and duration. Handout provided. Orange &  blue band provided. 2/22: instructed in and performed standing bicep stretch in doorway     Therapeutic Exercise and NMR EXR  [x] (98496) Provided verbal/tactile cueing for activities related to strengthening, flexibility, endurance, ROM for improvements in  [] LE / Lumbar: LE, proximal hip, and core control with self care, mobility, lifting, ambulation.   [x] UE / Cervical: cervical, postural, scapular, scapulothoracic and UE control with self care, reaching, carrying, lifting, house/yardwork, driving, computer work.  [] (54754) Provided verbal/tactile cueing for activities related to improving balance, coordination, kinesthetic sense, posture, motor skill, proprioception to assist with   [] LE / lumbar: LE, proximal hip, and core control in self care, mobility, lifting, ambulation and eccentric single leg control. [] UE / cervical: cervical, scapular, scapulothoracic and UE control with self care, reaching, carrying, lifting, house/yardwork, driving, computer work.      NMR and Therapeutic Activities:    [] (82197 or 64969) Provided verbal/tactile cueing for activities related to improving balance, coordination, kinesthetic sense, posture, motor skill, proprioception and motor activation to allow for proper function of   [] LE: / Lumbar core, proximal hip and LE with self care and ADLs  [] UE / Cervical: cervical, postural, scapular, scapulothoracic and UE control with self care, carrying, lifting, driving, computer work.   [] (72619) Gait Re-education- Provided training and instruction to the patient for proper LE, core and proximal hip recruitment and positioning and eccentric body weight control with ambulation re-education including up and down stairs     Home Exercise Program:    [] (13176) Reviewed/Progressed HEP activities related to strengthening, flexibility, endurance, ROM of   [] LE / Lumbar: core, proximal hip and LE for functional self-care, mobility, lifting and ambulation/stair navigation   [] UE / Cervical: cervical, postural, scapular, scapulothoracic and UE control with self care, reaching, carrying, lifting, house/yardwork, driving, computer work  [] (02207)Reviewed/Progressed HEP activities related to improving balance, coordination, kinesthetic sense, posture, motor skill, proprioception of   [] LE: core, proximal hip and LE for self care, mobility, lifting, and ambulation/stair navigation    [] UE / Cervical: cervical, postural,  scapular, scapulothoracic and UE control with self care, reaching, carrying, lifting,

## 2020-03-02 ENCOUNTER — OFFICE VISIT (OUTPATIENT)
Dept: ORTHOPEDIC SURGERY | Age: 45
End: 2020-03-02
Payer: COMMERCIAL

## 2020-03-02 VITALS
DIASTOLIC BLOOD PRESSURE: 83 MMHG | HEART RATE: 76 BPM | HEIGHT: 67 IN | BODY MASS INDEX: 41.59 KG/M2 | SYSTOLIC BLOOD PRESSURE: 127 MMHG | WEIGHT: 265 LBS

## 2020-03-02 PROCEDURE — 99212 OFFICE O/P EST SF 10 MIN: CPT | Performed by: ORTHOPAEDIC SURGERY

## 2020-03-02 NOTE — PROGRESS NOTES
Patient returns today for follow-up of his left shoulder. Felt he had AC arthrosis and impingement. He had an injection into both areas about a month ago and has been in physical therapy and he says that his pain is now gone. ROS: Pertinent items are noted in HPI.     No notes on file    Past Medical History:  No date: Bradycardia  No date: Colon polyp  No date: Diverticulitis  No date: Diverticulosis  No date: Hypercholesteremia  No date: IFG (impaired fasting glucose)     Past Surgical History:  No date: ADENOIDECTOMY      Comment:  UVULECTOMY  No date: COLONOSCOPY  No date: TONSILLECTOMY  9/43/3008: UMBILICAL HERNIA REPAIR; N/A      Comment:  OPEN UMBILICAL HERNIA REPAIR performed by Wally Montes MD at [facility] [place] 45 of patient's family history indicates:  Problem: Heart Disease      Relation: Mother          Age of Onset: (Not Specified)  Problem: High Cholesterol      Relation: Mother          Age of Onset: (Not Specified)  Problem: Diabetes      Relation: Mother          Age of Onset: (Not Specified)  Problem: Heart Disease      Relation: Father          Age of Onset: (Not Specified)  Problem: High Blood Pressure      Relation: Father          Age of Onset: (Not Specified)  Problem: High Blood Pressure      Relation: Brother          Age of Onset: (Not Specified)      Social History    Socioeconomic History      Marital status:       Spouse name: None      Number of children: None      Years of education: None      Highest education level: None    Occupational History      None    Social Needs      Financial resource strain: None      Food insecurity:        Worry: None        Inability: None      Transportation needs:        Medical: None        Non-medical: None    Tobacco Use      Smoking status: Never Smoker      Smokeless tobacco: Current User        Types: Chew    Substance and Sexual Activity      Alcohol use: Yes        Comment: ONCE A YEAR      Drug use: None

## (undated) DEVICE — SUTURE VCRL SZ 3-0 L27IN ABSRB UD L26MM SH 1/2 CIR J416H

## (undated) DEVICE — MAJOR SET UP PK

## (undated) DEVICE — NEEDLE HYPO 22GA L1.5IN BLK POLYPR HUB S STL REG BVL STR

## (undated) DEVICE — DRAPE ADOLESCENT  LAPAROTOMY

## (undated) DEVICE — SUTURE PROL SZ 0 L18IN NONABSORBABLE BLU MO-6 L26MM 1/2 CIR C845G

## (undated) DEVICE — STERILE POLYISOPRENE POWDER-FREE SURGICAL GLOVES: Brand: PROTEXIS

## (undated) DEVICE — SUTURE VCRL SZ 2-0 L27IN ABSRB UD L26MM SH 1/2 CIR J417H

## (undated) DEVICE — SOLUTION IV IRRIG POUR BRL 0.9% SODIUM CHL 2F7124

## (undated) DEVICE — COVER LT HNDL BLU PLAS

## (undated) DEVICE — SUTURE VCRL SZ 4-0 L18IN ABSRB UD L19MM PS-2 3/8 CIR PRIM J496H

## (undated) DEVICE — INTENDED FOR TISSUE SEPARATION, AND OTHER PROCEDURES THAT REQUIRE A SHARP SURGICAL BLADE TO PUNCTURE OR CUT.: Brand: BARD-PARKER ® STAINLESS STEEL BLADES

## (undated) DEVICE — KIT OR ROOM TURNOVER W/STRAP

## (undated) DEVICE — CHLORAPREP 26ML ORANGE

## (undated) DEVICE — BLADE ES ELASTOMERIC COAT INSUL DURABLE BEND UPTO 90DEG

## (undated) DEVICE — SKIN AFFIX SURG ADHESIVE 72/CS 0.55ML: Brand: MEDLINE